# Patient Record
Sex: MALE | Race: WHITE | NOT HISPANIC OR LATINO | URBAN - NONMETROPOLITAN AREA
[De-identification: names, ages, dates, MRNs, and addresses within clinical notes are randomized per-mention and may not be internally consistent; named-entity substitution may affect disease eponyms.]

---

## 2017-02-16 ENCOUNTER — FOLLOW UP ESTABLISHED (OUTPATIENT)
Dept: URBAN - NONMETROPOLITAN AREA CLINIC 13 | Facility: CLINIC | Age: 77
End: 2017-02-16
Payer: MEDICARE

## 2017-02-16 DIAGNOSIS — H35.3131 NONEXUDATIVE MACULAR DEGENERATION, EARLY DRY STAGE, BILATERAL: ICD-10-CM

## 2017-02-16 DIAGNOSIS — H02.423 MYOGENIC PTOSIS OF BILATERAL EYELIDS: Primary | ICD-10-CM

## 2017-02-16 PROCEDURE — 92134 CPTRZ OPH DX IMG PST SGM RTA: CPT | Performed by: OPTOMETRIST

## 2017-02-16 PROCEDURE — 92014 COMPRE OPH EXAM EST PT 1/>: CPT | Performed by: OPTOMETRIST

## 2017-02-16 ASSESSMENT — VISUAL ACUITY
OD: 20/25
OS: 20/25

## 2017-02-16 ASSESSMENT — INTRAOCULAR PRESSURE
OS: 10
OD: 9

## 2021-04-07 ENCOUNTER — OFFICE VISIT (OUTPATIENT)
Dept: URBAN - NONMETROPOLITAN AREA CLINIC 13 | Facility: CLINIC | Age: 81
End: 2021-04-07
Payer: MEDICARE

## 2021-04-07 DIAGNOSIS — H10.422 SIMPLE CHRONIC CONJUNCTIVITIS, LEFT EYE: ICD-10-CM

## 2021-04-07 PROCEDURE — 99204 OFFICE O/P NEW MOD 45 MIN: CPT | Performed by: OPTOMETRIST

## 2021-04-07 PROCEDURE — 92134 CPTRZ OPH DX IMG PST SGM RTA: CPT | Performed by: OPTOMETRIST

## 2021-04-07 RX ORDER — ERYTHROMYCIN 5 MG/G
OINTMENT OPHTHALMIC
Qty: 3 | Refills: 0 | Status: INACTIVE
Start: 2021-04-07 | End: 2022-04-08

## 2021-04-07 ASSESSMENT — INTRAOCULAR PRESSURE
OS: 10
OD: 10

## 2021-04-07 ASSESSMENT — VISUAL ACUITY
OS: LP
OD: 20/60

## 2021-04-07 NOTE — IMPRESSION/PLAN
Impression: Nonexudative macular degeneration, early dry stage, bilateral: H35.8479. Plan: Monitor. Macular degeneration dry type.  recommends taking AREDS2 vitamins and antioxidants as they have been shown to reduce the progression of macular degeneration. Pt needs to continually monitor vision. Patient was told to call office immediately with vision changes.

## 2021-04-07 NOTE — IMPRESSION/PLAN
Impression: Unspecified ptosis of bilateral eyelids: H02.403. Plan: monitor, pt is already s/p lid surgery, but has regressed w/ OS>OD, may consider surgery OD only to see if can improve vision OD, will consult w/ dr Mariah Tovar.

## 2021-04-07 NOTE — IMPRESSION/PLAN
Impression: Simple chronic conjunctivitis, left eye: H10.422. Plan: pt to start good lid hygiene and ees shon at night for 7 days, if does not clear, rtc for f/up/ pt ed on s/s of worsening issue need to rtc asap.

## 2022-04-08 ENCOUNTER — OFFICE VISIT (OUTPATIENT)
Dept: URBAN - NONMETROPOLITAN AREA CLINIC 13 | Facility: CLINIC | Age: 82
End: 2022-04-08
Payer: MEDICARE

## 2022-04-08 DIAGNOSIS — H02.403 UNSPECIFIED PTOSIS OF BILATERAL EYELIDS: ICD-10-CM

## 2022-04-08 DIAGNOSIS — Z96.1 PRESENCE OF INTRAOCULAR LENS: ICD-10-CM

## 2022-04-08 DIAGNOSIS — H01.002 UNSPECIFIED BLEPHARITIS OF RIGHT LOWER EYELID: ICD-10-CM

## 2022-04-08 DIAGNOSIS — H52.223 REGULAR ASTIGMATISM, BILATERAL: ICD-10-CM

## 2022-04-08 PROCEDURE — 92014 COMPRE OPH EXAM EST PT 1/>: CPT | Performed by: OPTOMETRIST

## 2022-04-08 PROCEDURE — 92134 CPTRZ OPH DX IMG PST SGM RTA: CPT | Performed by: OPTOMETRIST

## 2022-04-08 RX ORDER — ERYTHROMYCIN 5 MG/G
OINTMENT OPHTHALMIC
Qty: 3 | Refills: 2 | Status: ACTIVE
Start: 2022-04-08

## 2022-04-08 ASSESSMENT — INTRAOCULAR PRESSURE
OD: 7
OS: 7

## 2022-04-08 ASSESSMENT — VISUAL ACUITY
OD: 20/50
OS: 20/40

## 2022-04-08 NOTE — IMPRESSION/PLAN
Impression: Regular astigmatism, bilateral: H52.223. Plan: gave new rx.
use +2.00 +2.50 or +3.00 readers for near. consider bifocal vs sv distance.

## 2022-04-08 NOTE — IMPRESSION/PLAN
Impression: Unspecified ptosis of bilateral eyelids: H02.403. Plan: monitor, pt is already s/p lid surgery, but has regressed w/ OS>OD, pt unable to travel will monitor for now.

## 2022-04-08 NOTE — IMPRESSION/PLAN
Impression: Nonexudative age-related macular degeneration, bilateral, early dry stage: H35.3131. Plan: Macular degeneration dry type, with decrease  vision.  recommends taking AREDS2 vitamins and antioxidants as they have been shown to reduce the progression of macular degeneration. Pt needs to continually monitor vision. Patient was told to call office immediately with vision changes. needs annual monitor.

## 2022-04-08 NOTE — IMPRESSION/PLAN
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 Plan: use erythromycin shon at night and use tears daily OU w/ good lid hygiene oU.